# Patient Record
Sex: FEMALE | Race: BLACK OR AFRICAN AMERICAN | Employment: FULL TIME | URBAN - METROPOLITAN AREA
[De-identification: names, ages, dates, MRNs, and addresses within clinical notes are randomized per-mention and may not be internally consistent; named-entity substitution may affect disease eponyms.]

---

## 2017-05-04 PROBLEM — M54.41 CHRONIC BILATERAL LOW BACK PAIN WITH BILATERAL SCIATICA: Status: ACTIVE | Noted: 2017-05-04

## 2017-05-04 PROBLEM — G89.29 CHRONIC BILATERAL LOW BACK PAIN WITH BILATERAL SCIATICA: Status: ACTIVE | Noted: 2017-05-04

## 2017-05-04 PROBLEM — M54.42 CHRONIC BILATERAL LOW BACK PAIN WITH BILATERAL SCIATICA: Status: ACTIVE | Noted: 2017-05-04

## 2017-11-10 PROBLEM — E78.2 MIXED HYPERLIPIDEMIA: Status: ACTIVE | Noted: 2017-11-10

## 2018-04-19 PROBLEM — R73.02 IMPAIRED GLUCOSE TOLERANCE: Status: ACTIVE | Noted: 2018-04-19

## 2018-04-20 ENCOUNTER — OFFICE VISIT (OUTPATIENT)
Dept: SURGERY | Age: 38
End: 2018-04-20

## 2018-04-20 VITALS
BODY MASS INDEX: 40.97 KG/M2 | DIASTOLIC BLOOD PRESSURE: 87 MMHG | OXYGEN SATURATION: 98 % | RESPIRATION RATE: 20 BRPM | SYSTOLIC BLOOD PRESSURE: 142 MMHG | HEIGHT: 64 IN | HEART RATE: 70 BPM | TEMPERATURE: 98.4 F | WEIGHT: 240 LBS

## 2018-04-20 DIAGNOSIS — E66.01 MORBID OBESITY (HCC): Primary | ICD-10-CM

## 2018-04-20 DIAGNOSIS — K21.9 GASTROESOPHAGEAL REFLUX DISEASE WITHOUT ESOPHAGITIS: ICD-10-CM

## 2018-04-20 DIAGNOSIS — R11.2 NAUSEA AND VOMITING, INTRACTABILITY OF VOMITING NOT SPECIFIED, UNSPECIFIED VOMITING TYPE: ICD-10-CM

## 2018-04-20 DIAGNOSIS — I10 ESSENTIAL HYPERTENSION: ICD-10-CM

## 2018-04-20 PROBLEM — E78.2 MIXED HYPERLIPIDEMIA: Status: RESOLVED | Noted: 2017-11-10 | Resolved: 2018-04-20

## 2018-04-20 NOTE — PROGRESS NOTES
Jalen Mims is a 43year old female that present for evaluation for weight loss surgery. Patient has been overweight since childhood. Patient height is 5'4'', weight is 240 pounds, BMI is 41.20    Dietary Habits: Patient eating 3 meals a day. Cooks at home with baked grill fried foods. Denies sweet tooth, eating a lot of starchy foods. Stated decrease in portion sizes. Beverage of choice is water and sweet iced coffee. No exercise. Prior weight loss attempts:    Co-Morbid Conditions/ Past Medical History/ Medications:  Problem List  Date Reviewed: 4/20/2018          Codes Class Noted    Morbid obesity ([facility] [address] ICD-10-CM: E66.01  ICD-9-CM: 278.01  Unknown    Overview Signed 4/20/2018  2:20 PM by Juan R Polo NP     Overweight since childhood. Highest adult weight is 240 pounds. Lowest adult weight was 175 pounds in college             Hyperlipidemia ICD-10-CM: E78.5  ICD-9-CM: 272.4  Unknown        GERD (gastroesophageal reflux disease) ICD-10-CM: K21.9  ICD-9-CM: 530.81  Unknown        Impaired glucose tolerance ICD-10-CM: R73.02  ICD-9-CM: 790.22  4/19/2018        Chronic bilateral low back pain with bilateral sciatica ICD-10-CM: M54.42, M54.41, G89.29  ICD-9-CM: 724.2, 724.3, 338.29  5/4/2017        Insulin resistance ICD-10-CM: E88.81  ICD-9-CM: 277.7  9/12/2014        PMS (premenstrual syndrome) ICD-10-CM: N94.3  ICD-9-CM: 625.4  9/12/2014        Chronic endometritis ICD-10-CM: N71.1  ICD-9-CM: 615.1  9/12/2014        Hypertension ICD-10-CM: I10  ICD-9-CM: 401.9  5/6/2014        Nausea & vomiting ICD-10-CM: R11.2  ICD-9-CM: 787.01  5/3/2011              Current Outpatient Prescriptions:     labetalol (NORMODYNE) 200 mg tablet, TAKE 1 TABLET BY MOUTH TWICE A DAY, Disp: 60 Tab, Rfl: 1    atorvastatin (LIPITOR) 20 mg tablet, Take 1 Tab by mouth daily. , Disp: 90 Tab, Rfl: 1    verapamil ER (VERELAN) 360 mg ER capsule, Take 1 Cap by mouth daily. , Disp: 90 Cap, Rfl: 1    famotidine (PEPCID AC) 10 mg tablet, Take 10 mg by mouth nightly., Disp: , Rfl:       Surgical History/ Prior Hospitalizations/ Psychiatric Admissions:  Past Surgical History:   Procedure Laterality Date    HX DILATION AND CURETTAGE  2015    HX GI      laparotomy, colonoscopy.  HX GYN  2001    cervical cryotherapy    HX GYN  2014    hysteroscopy, hysterosalpingogram    HX HEENT      sinus susrgery    HX PELVIC LAPAROSCOPY  2011    HX PELVIC LAPAROSCOPY  2014    endometriosis vaporization       Pregnancy History and Complications:  3 Para 1 Miscarriage 2    Primary Care and Other Pertinent Providers: Davon Case MD is primary care provider. Allergies:    Allergies   Allergen Reactions    Codeine Itching and Nausea and Vomiting       Family History:   Family History   Problem Relation Age of Onset    Substance Abuse Father     Diabetes Father     Depression Father     Heart Disease Father 61    Cancer Father      prostate    Diabetes Mother     Hypertension Mother    [facility] Depression Mother     Stroke Mother 61    Other Mother      hysterectomy age 29's for dysmenorrhea    Kidney Disease Mother     Diabetes Sister     Diabetes Maternal Grandmother     Hypertension Maternal Grandmother     Heart Disease Maternal Grandmother     Hypertension Maternal Grandfather     Heart Disease Maternal Grandfather     Other Sister      hysterecomy age 29's for dysmenorrhea    Substance Abuse Other             Social History:   Social History     Social History    Marital status:      Spouse name: N/A    Number of children: 1    Years of education: Masters     Occupational History    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[employer]     Social History Main Topics    Smoking status: Never Smoker    Smokeless tobacco: Never Used    Alcohol use No      Comment: on occasion, approx 2x/mo    Drug use: No    Sexual activity: Yes     Partners: Male     Birth control/ protection: None, IUD      Comment:  Other Topics Concern    Not on file     Social History Narrative    : Naty Vines     1981    NKDA    SFA 3/28/2014 - borderline oligospermia (21x10^6), low motility (3%), low morphology (2%)    SFA 3/13/2014 - oligospermia (10x10^6), low motility (30%), low morphology (1%)    Lives with , 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 Physical Examination: Blood Pressure today is 142/87. Patient is a pleasant black female in no acute distress. Patient had adequate dentition. Skin is warm and dry. Chest is clear. Heart with regular rate and rhythmn. Abdomen is obese, soft, non-tender, non-distended, bowels sound present in all four quadrants, no masses palpated. Extremities warm to the touch without edema. Sleeve gastrectomy or vertical gastric resection involves a resection of the vast majority of the stomach usually done with a dilator pressed against the right half of the stomach of the lesser curvature. One preserves the pyloric sphincter at the lower end of the stomach and then sequentially staples and divides all the way up to the gastroesophageal junction leaving a small rim of the stomach to the left better known as the angle of His. This procedure significantly reduces the amount that the stomach can hold while removing the part of the stomach that secretes the hormone grehlin and alters the speed of food emptying from the stomach. Once food leaves the stomach, the remainder of the intestinal tract is completely intact and there is no effect on the digestion or absorption of food nutrients and calories. The procedure is intermediate between the adjustable gastric band and the gastric bypass as far as weight loss, potential complications and resolution of comorbid diseases such as Type 2 diabetes, high blood pressure, sleep apnea, arthritic pain, cholesterol disorders to mention a few.  The usual complications are that of any procedure which affects the ability of the stomach to accept food at any given time. Those are usually nausea and vomiting which either spontaneously resolve or require endoscopic dilatation. The most serious complication from this surgery is a leak from the staple line usually near the  gastroesophageal junction. The frequency of this serious complication is low and usually heals spontaneously although reoperation may be necessary. The other serious complications are those which can occur with any major surgery and include  Infection, bleeding, blood clots and/or cardiopulmonary problems. Patient meets criteria established by the NIH. Without weight reduction, co-morbidities will escalate as well as risk of early mortality. Recommendation is patient could be served with surgical weight reduction, the procedure of laparoscopic sleeve gastrectomy was discussed. I explained to the patient differences between laparoscopic and open gastric bypass, laparoscopic adjustable gastric banding, and sleeve gastrectomy procedures. Patient has attended one our informational meeting and has seen our educational materials. Patient desires to have surgery with Dr. Charlie Manning     I have reinforced without lifestyle change and behavior modification, they would not achieve his weight loss goals. I reviewed risks and complications associated with each procedure. Other recommendations include psychological evaluation, nutritional consultation, GI consultation for possible endoscopy with local GI Specialist. Patient informed she must complete a 6 consecutive month physician supervised diet. I discussed with patient a diet high in protein, low-fat, low- sugar, limited carbohydrates, and discontinuing use of carbonated beverages. Also discussed physical activity and exercises. I have answered all questions, they wish to proceed. CC: Felice Brewer MD        62 minutes was spent with patient.

## 2018-04-20 NOTE — MR AVS SNAPSHOT
[address] [facility] [id number] [phone] Patient: Tony Moore MRN: [id number] LOQ:[birth year] Visit Information Date & Time Provider Department Dept. Phone Encounter #  
 4/20/2018 11:00 AM Teresa Mckenzie NP [place] [phone] [id number] Upcoming Health Maintenance Date Due  
 PAP AKA CERVICAL CYTOLOGY 8/7/2016 Influenza Age 5 to Adult 8/1/2017 DTaP/Tdap/Td series (2 - Td) 8/1/2026 Allergies as of 4/20/2018  Review Complete On: 4/20/2018 By: Teresa Mckenzie NP Severity Noted Reaction Type Reactions Codeine  08/08/2014    Itching, Nausea and Vomiting Current Immunizations  Never Reviewed No immunizations on file. Not reviewed this visit You Were Diagnosed With   
  
 Codes Comments [facility]    -  Primary ICD-10-CM: E66.01 
ICD-9-CM: 278.01 Gastroesophageal reflux disease without esophagitis     ICD-10-CM: K21.9 ICD-9-CM: 530.81 Nausea and vomiting, intractability of vomiting not specified, unspecified vomiting type     ICD-10-CM: R11.2 ICD-9-CM: 787.01 Essential hypertension     ICD-10-CM: I10 
ICD-9-CM: 401.9 BMI 40.0-44.9, adult [facility])     ICD-10-CM: Z68.41 
ICD-9-CM: V85.41 Vitals BP Pulse Temp Resp Height(growth percentile) Weight(growth percentile) 142/87 (BP 1 Location: Left arm, BP Patient Position: Sitting) 70 98.4 °F (36.9 °C) (Oral) 20 5' 4\" (1.626 m) 240 lb (108.9 kg) SpO2 BMI OB Status Smoking Status 98% 41.2 kg/m2 IUD Never Smoker BMI and BSA Data Body Mass Index Body Surface Area  
 41.2 kg/m 2 2.22 m 2 Preferred Pharmacy Pharmacy Name Phone  W MD [address] [phone] Your Updated Medication List  
  
   
This list is accurate as of 4/20/18  3:14 PM.  Always use your most recent med list.  
  
 atorvastatin 20 mg tablet Commonly known as:  LIPITOR Take 1 Tab by mouth daily. labetalol 200 mg tablet Commonly known as:  NORMODYNE  
TAKE 1 TABLET BY MOUTH TWICE A DAY PEPCID AC 10 mg tablet Generic drug:  famotidine Take 10 mg by mouth nightly. verapamil  mg ER capsule Commonly known as:  Kaleta Shafer Take 1 Cap by mouth daily. We Performed the Following REFERRAL TO GASTROENTEROLOGY [LXK52 Custom] REFERRAL TO NUTRITION [REF50 Custom] Referral Information Referral ID Referred By Referred To  
  
 [id number] Kem Apgar Not Available Visits Status Start Date End Date 1 New Request 18 If your referral has a status of pending review or denied, additional information will be sent to support the outcome of this decision. Referral ID Referred By Referred To  
 [id number] Kem Apgar Not Available Visits Status Start Date End Date 1 New Request 18 If your referral has a status of pending review or denied, additional information will be sent to support the outcome of this decision. Introducing [facility] & HEALTH SERVICES! Dear Kimberly Brown: Thank you for requesting a [employer] account. Our records indicate that you already have an active [employer] account. You can access your account anytime at [email or web address] Did you know that you can access your hospital and ER discharge instructions at any time in [employer]? You can also review all of your test results from your hospital stay or ER visit. Additional Information If you have questions, please visit the Frequently Asked Questions section of the [employer] website at [email or web address] Remember, [employer] is NOT to be used for urgent needs. For medical emergencies, dial 911. Now available from your iPhone and Android! Please provide this summary of care documentation to your next provider. Your primary care clinician is listed as Karyn Dutta. If you have any questions after today's visit, please call [phone].

## 2018-10-17 ENCOUNTER — PATIENT OUTREACH (OUTPATIENT)
Dept: OTHER | Age: 38
End: 2018-10-17

## 2018-10-17 NOTE — PROGRESS NOTES
Spoke with Ms. George Rosario. Briefly told her about the program.  She requested that I contact her tomorrow around 12:00, stated she had just been discharged last night from the hospital.  Apologized and told her I would call back, as requested.

## 2018-10-17 NOTE — PATIENT INSTRUCTIONS
Infection After Surgery: Care Instructions Your Care Instructions After surgery, an infection is always possible. It doesn't mean that the surgery didn't go well. Because an infection can be serious, your doctor has taken steps to manage it. Your doctor checked the infection and cleaned it if necessary. He or she may have made an opening in the area so that the pus can drain out. You may have gauze in the cut so that the area will stay open and keep draining. You may need antibiotics. You will need to follow up with your doctor to make sure the infection has gone away. Follow-up care is a key part of your treatment and safety. Be sure to make and go to all appointments, and call your doctor if you are having problems. It's also a good idea to know your test results and keep a list of the medicines you take. How can you care for yourself at home? · Make sure your surgeon knows that you saw a doctor about the infection. · If your doctor prescribed antibiotics, take them as directed. Do not stop taking them just because you feel better. You need to take the full course of antibiotics. · Ask your doctor if you can take an over-the-counter pain medicine, such as acetaminophen (Tylenol), ibuprofen (Advil, Motrin), or naproxen (Aleve). Be safe with medicines. Read and follow all instructions on the label. · Do not take two or more pain medicines at the same time unless the doctor told you to. Many pain medicines have acetaminophen, which is Tylenol. Too much acetaminophen (Tylenol) can be harmful. · Prop up the area on a pillow anytime you sit or lie down during the next 3 days. Try to keep it above the level of your heart. This will help reduce swelling. · Keep the skin clean and dry. · If you have a bandage, keep it clean and dry. · You may have a dressing over the cut (incision). A dressing helps the incision heal and protects it.  Your doctor will tell you how to take care of this. You can expect drainage from the wound. · If your doctor told you how to care for your incision, follow your doctor's instructions. If you did not get instructions, follow this general advice: 
? Wash around the incision with clean water 2 times a day. Don't use hydrogen peroxide or alcohol, which can slow healing. When should you call for help? Call your doctor now or seek immediate medical care if: 
  · You have signs that your infection is getting worse, such as: 
? Increased pain, swelling, warmth, or redness in the area. ? Red streaks leading from the area. ? Pus draining from the wound. ? A new or higher fever.  
 Watch closely for changes in your health, and be sure to contact your doctor if you have any problems. Where can you learn more? Go to http://annie-jose.info/. Enter C340 in the search box to learn more about \"Infection After Surgery: Care Instructions. \" Current as of: November 20, 2017 Content Version: 11.8 © 2388-3321 [employer]wise[employer]. Care instructions adapted under license by [employer] (which disclaims liability or warranty for this information). If you have questions about a medical condition or this instruction, always ask your healthcare professional. [place] [address] any warranty or liability for your use of this information.

## 2018-10-18 ENCOUNTER — PATIENT OUTREACH (OUTPATIENT)
Dept: OTHER | Age: 38
End: 2018-10-18

## 2018-10-18 NOTE — PROGRESS NOTES
Red Flags: 
 
Call your doctor now or seek immediate medical care if: 
  · You have signs that your infection is getting worse, such as: 
? Increased pain, swelling, warmth, or redness in the area. ? Red streaks leading from the area. ? Pus draining from the wound. ? A new or higher fever. HPRR progress note Patient eligible for [employer] Employee care management Discussed the care management program.  Patient agrees to care management services at this time. PMH:  
Past Medical History:  
Diagnosis Date  Chronic pain   
 in back,was dysmenorrhea,  resolved  Endometriosis of ovary 2014  Endometriosis of peritoneum 2014  Family planning w/ limited mucus 2014  GERD (gastroesophageal reflux disease)  Heart murmur  Heavy menstrual bleeding 2014  
 HSV (herpes simplex virus) anogenital infection   
 genital herpes  Hyperlipidemia  Hypertension 2000  Morbid obesity ([facility] [address]  Nausea & vomiting Social History:  
Social History Socioeconomic History  Marital status:  Spouse name: Not on file  Number of children: 1  Years of education: Masters  Highest education level: Not on file Social Needs  Financial resource strain: Not on file  Food insecurity - worry: Not on file  Food insecurity - inability: Not on file  Transportation needs - medical: Not on file  Transportation needs - non-medical: Not on file Occupational History  Occupation: 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Employer: [employer] Tobacco Use  Smoking status: Never Smoker  Smokeless tobacco: Never Used Substance and Sexual Activity  Alcohol use: No  
  Alcohol/week: 0.0 oz  
  Comment: on occasion, approx 2x/mo  Drug use: No  
 Sexual activity: Yes  
  Partners: Male Birth control/protection: None, IUD Comment:  Other Topics Concern  Not on file Social History Narrative : Tayler Levin  1981 NKDA  
 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 Care management assessment completed: 
 
Condition Focused Assessment: 
Surgical/Wound Condition Focused Assessment Skin- any open wounds or incisions? yes Description and location of wound- itchy, two red rashes under breasts (PCP ordered cream) In the last 24 hour have you experienced; Fever no Low body temperature no Chills or shaking no Sweating no Fast heart rate no Fast breathing no Dizziness/lightheadedness no Confusion or unusual change in mental status no Diarrhea no Nausea yesterday (10/17) Vomiting yes dry heaves Shortness of breath or difficulty breathing no Less urine output yes Cold, clammy, and pale skin yes Skin rash or skin color changes yes New or worsening pain? Stays around 5 out of 10 If yes, pain rated 0-10: 5-10 Location/pain characteristics: belly, dull, achy, more pronounced with movement New or worsening numbness or tingling? no If yes, location of numbness and tingling: n/a Activity level- moving several times a day, or as recommended? Yes, went to hair salon and went to [employer]. Abnormal activity level reported: no  
Bathing and showering instructions? yes Nutrition- prescribed diet? Full liquid diet, 64 ounces per day. Today, doing better. Right now at 15 ounces. Tolerating food? yes Hydration- (how much in ounces per day) 15 ounces per day (will call MD regarding this)  Patient advised to get 59 ounces/day Medications- new antibiotic? no 
           Pain medication? yes Does patient understand how and when to take their medications? yes If no, instructed patient on proper medication use? yes Does patient have incentive spirometer? yes If yes, how frequently is patient using ICS? Has not used since she came home, recommended use. Medications: 
New Medications at Discharge: dilaudid, prilosec, phenergan, multivitamins, Calcium Changed Medications at Discharge: no  
Discontinued Medications at Discharge: no 
Current Outpatient Medications Medication Sig  
 atorvastatin (LIPITOR) 20 mg tablet Take 20 mg by mouth daily.  labetalol (NORMODYNE) 200 mg tablet Take 200 mg by mouth daily.  verapamil ER (VERELAN) 180 mg CR capsule Take 180 mg by mouth two (2) times a day.  HYDROmorphone (DILAUDID) 2 mg tablet Take 2 mg by mouth every four (4) hours as needed.  clotrimazole-betamethasone (LOTRISONE) topical cream Apply  to affected area two (2) times a day.  labetalol (NORMODYNE) 200 mg tablet Take 1 Tab by mouth daily.  atorvastatin (LIPITOR) 20 mg tablet TAKE 1 TABLET BY MOUTH EVERY DAY  verapamil ER (VERELAN) 180 mg CR capsule Take 1 Cap by mouth two (2) times a day.  tolterodine ER (DETROL LA) 4 mg ER capsule Take 1 Cap by mouth daily.  famotidine (PEPCID AC) 10 mg tablet Take 1 Tab by mouth nightly. No current facility-administered medications for this visit. There are no discontinued medications. Performed medication reconciliation with patient, and patient verbalizes understanding of administration of home medications. There were no barriers to obtaining medications identified at this time. Preventative Care Health Maintenance Topic Date Due  
 PAP AKA CERVICAL CYTOLOGY  08/07/2016  Influenza Age 5 to Adult  08/01/2018  DTaP/Tdap/Td series (2 - Td) 08/01/2026 Healthy Habits Nutrition: Full Liquid Diet Movement: Activity level appropriate. Walking up steps to go to bathroom. Goals Goals Patient Stated  Understands red flags post discharge. (pt-stated) Pt will maintain liquid diet as stated on discharge instructions over the next 30 days Pt will take medications as prescribed over the next 30 days Pt will monitor for s/s of infection at surgical incision sites (ie, redness, swelling, foul drainage, fever)  and seek medical attention within the next 7 -14 days as needed Pt will keep all scheduled f/u appointments with PCP and Specialist over the next 30 days Barriers/Support system: 
patient and  Barriers/Challenges to Care: []  Decline in memory    []  Language barrier    
[]  Emotional                  []  Limited mobility 
[]  Lack of motivation     [] Vision, hearing or cognitive impairment []  Knowledge [] Financial Barriers []  Lack of support  []  Pain []  Other NONE 
PCP/Specialist follow up:  
Future Appointments Date Time Provider Sepideh Singh 10/23/2018 11:15 AM MD Guille Ac Reviewed red flags with patient, and patient verbalizes understanding. Patient given an opportunity to ask questions. No other clinical/social/functional needs noted. The patient agrees to contact the PCP office for questions related to their healthcare. The patient expressed thanks, offered no additional questions and ended the call. Plan for next call: 10/25/2018

## 2018-10-18 NOTE — PROGRESS NOTES
10/18/2018 4:00 spoke with Tyler Garrett in Dr. Mary Spencer office regarding low fluid intake. Let her know that patient states she has only had 15 ounces of fluid today. Tyler Garrett shared my concern and stated she would have the nurse practitioner reach out to the patient. Provided my call back information.

## 2018-10-19 ENCOUNTER — PATIENT OUTREACH (OUTPATIENT)
Dept: OTHER | Age: 38
End: 2018-10-19

## 2018-10-19 NOTE — PROGRESS NOTES
Covering for Tamra Argueta RN for [facility] follow-up; Care Manager contacted the patient by telephone in follow up. Request for  and zip code as patient identifiers. Patient states she is at drive-thru at [address] getting breakfast for the kids;  
 
State she continues to feel discomfort with consuming liquids. Reminded her she will feel full quickly, especially at first.  States she could only consume 20-25 ounces of liquid yesterday. Denies coughing and dysphagia with any liquids; 
 
States she has not heard back from the surgeon's office as yet;  Encouraged patient to contact the surgeon's office later today to give them an update on the amount of fliud intake today; Also encouraged patient to contact the on call surgeon over the weekend, if her symptoms worsened or her intake remained less than 30ozs; She agreed; 
 
Provided my contact information if she had any questions this afternoon;  Thanked her for taking my call today;

## 2018-10-24 ENCOUNTER — PATIENT OUTREACH (OUTPATIENT)
Dept: OTHER | Age: 38
End: 2018-10-24

## 2018-10-24 NOTE — PROGRESS NOTES
Follow up phone call to patient, two pt identifiers verified. Discussed patient's goals:  
 
Patient has been placed on a purred diet, as of Monday. We discussed the importance of meal prepping. She stated she had good intentions this morning but ran behind and had to order food, which did not agree with her. She recognizes that planning is very important. She states that she has the support of her  and knows that her daughters will benefit from healthier eating habits. She states that it is a big life style change for her family. She was excited to share that she and her family have joined a gym. She plans to participate in [employer] classes. We discussed making this a priority and coming up with a feasible plan for making time to get to the gym. She plans to go in the evenings and will take advantage of free . Patient is aware of her lifting restrictions. She has been using the medication for her fungal infection, under her breasts and also stated she had a sensitivity to the adhesive used on her incisions. Her doctor is aware of this. Roxana King was excited to tell me that one of her blood pressure medications has been cut in half. Provided praise, she is on the right track and is very dedicated to creating a healthy lifestyle. Readiness to Change: []  Pre-contemplative    []  Contemplative 
[]  Preparation               [x]  Action                  []  Maintenance Barriers/Challenges to Care: []  Decline in memory    []  Language barrier    
[]  Emotional                  []  Limited mobility 
[]  Lack of motivation     [] Vision, hearing or cognitive impairment []  Knowledge [] Financial Barriers []  Lack of support  []  Pain []  Other [x]  None Patient has no barriers. Key pt activities to achieve better health:  
[x]  Weight loss 
[]  Improved diabetic control [x]  Decreased cholesterol levels [x]  Decreased blood pressure [x]  Exercise [] Upcoming appointments: December 11, 2018 Plan for next call: In about 2 weeks. Abraham Ferrer that if she needs anything to please call me.

## 2018-11-20 ENCOUNTER — PATIENT OUTREACH (OUTPATIENT)
Dept: OTHER | Age: 38
End: 2018-11-20

## 2018-11-20 NOTE — PROGRESS NOTES
Follow up phone call to patient, two pt identifiers verified. Discussed patient's goals:  
Goals Patient Stated  Hydration per recommendation  (pt-stated) MD recommends 64 ounces of full liquids per day. Discussed importance of maintaining hydration. Patient stated she had only had 15 ounces of fluid today. Asked her if breaking it up by the hours she is awake may help. Provided example of only having to drink 5.3 ounces per hour if she is awake for 12 hours. She thought that may help. Asked her if it would be alright if I called Dr. Reynolds Money office to let them know about the low intake. She said that would be fine.  Understands red flags post discharge. (pt-stated) Pt will maintain liquid diet as stated on discharge instructions over the next 30 days Pt will take medications as prescribed over the next 30 days Pt will monitor for s/s of infection at surgical incision sites (ie, redness, swelling, foul drainage, fever)  and seek medical attention within the next 7 -14 days as needed Pt will keep all scheduled f/u appointments with PCP and Specialist over the next 30 days Other  Understands red flags post discharge. Call your doctor now or seek immediate medical care if: 
  · You have signs that your infection is getting worse, such as: 
? Increased pain, swelling, warmth, or redness in the area. ? Red streaks leading from the area. ? Pus draining from the wound. ? A new or higher fever. Patient states she is having a hard time getting in  grams of protein a day. She is tired of scrambled eggs. She is going to try boiling eggs. She is eating yogurt that's high in protein. I asked about adding protein powder. She says she is using protein shakes. She said she was upset because she found out her scales were broken and she had not lost as much as she had thought.   I reinforced that fact that she is feeling good and that was a huge plus. She is getting to the gym about two times a week. She is having a hard time with meal prepping and would like to work on that. She had to go because her kids needed attention. Told her I'd follow up in a few weeks. She was appreciative. Readiness to Change: []  Pre-contemplative    []  Contemplative 
[]  Preparation               [x]  Action                  []  Maintenance Barriers/Challenges to Care: []  Decline in memory    []  Language barrier    
[]  Emotional                  []  Limited mobility 
[]  Lack of motivation     [] Vision, hearing or cognitive impairment []  Knowledge [] Financial Barriers []  Lack of support  []  Pain []  Other [x]  None Key pt activities to achieve better health:  
[x]  Weight loss 
[]  Improved diabetic control 
[]  Decreased cholesterol levels 
[]  Decreased blood pressure 
[]   
[] Upcoming appointments: Dr. Esther Oliveros 11/26 Future Appointments Date Time Provider Sepideh Singh 11/26/2018  9:00 AM Garcia Madison MD Enoch Pratt Enoch Pratt Plan for next call: December 7th.

## 2018-12-12 ENCOUNTER — PATIENT OUTREACH (OUTPATIENT)
Dept: OTHER | Age: 38
End: 2018-12-12

## 2018-12-12 NOTE — PROGRESS NOTES
Follow up phone call to patient, two pt identifiers verified. Discussed patient's goals:   Goals        Patient Stated     Patient will increase protein (pt-stated)      Patient plans to increase her protein to 80 grams per day. She will maintain this by drinking protein shakes and eating protein bars, as well as meal prepping.  Patient wishes to be 199 lbs by 1/14/19 (pt-stated)      Patient stated she wishes to be 199 or less by January 14, 2019. She plans to increase her protein to 80 grams per day and get back to meal prepping.  Patient wishes to stop blood pressure medication (pt-stated)      Due to her weight loss, patient will monitior BP daily or every other day for about two weeks to ensure pressures are well controlled. She will present that to her PCP with hopes of coming off of her medication.                 Readiness to Change: []  Pre-contemplative    []  Contemplative  []  Preparation               [x]  Action                  []  Maintenance    Barriers/Challenges to Care: []  Decline in memory    []  Language barrier     []  Emotional                  []  Limited mobility  []  Lack of motivation     [] Vision, hearing or cognitive impairment []  Knowledge [] Financial Barriers []  Lack of support  []  Pain []  Other [x]  None    Key pt activities to achieve better health:   [x]  Weight loss  []  Improved diabetic control  []  Decreased cholesterol levels  []  Decreased blood pressure  []    []    Upcoming appointments:   Future Appointments   Date Time Provider Sepideh Singh   2/26/2019  8:45 AM Kervin Madison MD [place] for next call: January 14, 2019

## 2019-01-14 ENCOUNTER — PATIENT OUTREACH (OUTPATIENT)
Dept: OTHER | Age: 39
End: 2019-01-14

## 2019-01-14 NOTE — PROGRESS NOTES
Attempt to reach patient for follow up. Discreet VM left with contact information. Will await call back and/or follow up in a week or so.

## 2019-01-15 ENCOUNTER — PATIENT OUTREACH (OUTPATIENT)
Dept: OTHER | Age: 39
End: 2019-01-15

## 2019-01-15 NOTE — PROGRESS NOTES
Follow up phone call to patient, two pt identifiers verified. Discussed patient's goals:  
 
Goals Addressed This Visit's Progress Patient Stated  Patient will increase protein (pt-stated)   On track Patient plans to increase her protein to 80 grams per day. She will maintain this by drinking protein shakes and eating protein bars, as well as meal prepping. 1/15  Discussed high protein foods to incorporate into meals. Chicken, beans, and fish are currently most utilized. Encouraged patient to use [employer] website to get more ideas.  Patient wishes to stop blood pressure medication (pt-stated)   On track Due to her weight loss, patient will monitior BP daily or every other day for about two weeks to ensure pressures are well controlled. She will present that to her PCP with hopes of coming off of her medication. 1/15 Patient goes back in February to her PCP and will be evaluated to see if she can come off of her blood pressure medication.  Weight loss (pt-stated)   On track Patient stated she wishes to be 199 or less by January 14, 2019. She plans to increase her protein to 80 grams per day and get back to meal prepping. 1/15/2019 Patient states she has reached 201 lbs. She said, \"the holidays were a struggle\"    She is getting bored with meals. She is considering working with a nutritionist that works with people for bariatric keto diets and may sign up for that. She was excited that it also provides meal planning. Offered [email or web address] com for meal planning. She is eating eggs every other day for breakfast. We discussed yogurt with fruit as another option. Suggested okios yogurt, which has 15 grams of protein, but she is trying to stay away from artifical sweeteners. Encouraged patient to look at [facility] website to get ideas for meals. New goal: Patient states, \"I want to be between 195-190 lbs by her birthday, which is [birth year]. \" Readiness to Change: []  Pre-contemplative    []  Contemplative 
[]  Preparation               [x]  Action                  []  Maintenance Barriers/Challenges to Care: []  Decline in memory    []  Language barrier    
[]  Emotional                  []  Limited mobility 
[]  Lack of motivation     [] Vision, hearing or cognitive impairment []  Knowledge [] Financial Barriers []  Lack of support  []  Pain []  Other [x]  None Key pt activities to achieve better health:  
[x]  Weight loss 
[]  Improved diabetic control [x]  Decreased cholesterol levels [x]  Decreased blood pressure 
[]   
[] Upcoming appointments:  
Future Appointments Date Time Provider Sepideh Singh 2/26/2019  8:45 AM Keyur Madison MD Enoch Pratt Enoch Pratt Plan for next call:  Check to see if patient has made it to her goal.  Check on whether she was able to get ideas from emeals and/or nutritionist.  Will call after her birthday ([birth year]).

## 2019-02-21 ENCOUNTER — PATIENT OUTREACH (OUTPATIENT)
Dept: OTHER | Age: 39
End: 2019-02-21

## 2019-02-21 NOTE — PROGRESS NOTES
Follow up phone call to patient, two pt identifiers verified. Discussed patient's goals:  
Goals Addressed This Visit's Progress Patient Stated  Patient will increase protein (pt-stated)   On track Patient plans to increase her protein to 80 grams per day. She will maintain this by drinking protein shakes and eating protein bars, as well as meal prepping. 1/15  Discussed high protein foods to incorporate into meals. Chicken, beans, and fish are currently most utilized. Encouraged patient to use [employer] website to get more ideas.  Patient wishes to stop blood pressure medication (pt-stated)   On track Due to her weight loss, patient will monitior BP daily or every other day for about two weeks to ensure pressures are well controlled. She will present that to her PCP with hopes of coming off of her medication. 1/15 Patient goes back in February to her PCP and will be evaluated to see if she can come off of her blood pressure medication.  Weight loss (pt-stated)   On track Patient stated she wishes to be 199 or less by January 14, 2019. She plans to increase her protein to 80 grams per day and get back to meal prepping. 1/15/2019 Patient states she has reached 201 lbs. She said, \"the holidays were a struggle\"    She is getting bored with meals. She is considering working with a nutritionist that works with people for bariatric keto diets and may sign up for that. She was excited that it also provides meal planning. Offered [email or web address] com for meal planning. She is eating eggs every other day for breakfast. We discussed yogurt with fruit as another option. Suggested okios yogurt, which has 15 grams of protein, but she is trying to stay away from artifical sweeteners. Encouraged patient to look at [facility] website to get ideas for meals. New goal: Patient states, \"I want to be between 195-190 lbs by her birthday, which is [birth year]. \" 2/21/19:  Patient reports she made it to her goal of 195 lbs. Congratulated her for that accomplishment. New Goal: Patient stated, \"I want to get down to 185 lbs by next call. \"   
  
  
 
Patient states her co-workers gave her a meal prepping service for her birthday. They are also holding each other accountable by logging their lunches and talking about what they are going to have for dinner. She does best with accountability. She stated she is not exercising but doing small things like taking stairs, parking farther away in the parking lot and getting up to talk to co workers instead of sending emails. She has a positive attitude and has set a new goal of 185 lbs. Provided encouragement. Patient's primary care provider relationship reviewed with patient and modified, as applicable. Readiness to Change: []  Pre-contemplative    []  Contemplative 
[]  Preparation               [x]  Action                  []  Maintenance Barriers/Challenges to Care: []  Decline in memory    []  Language barrier    
[]  Emotional                  []  Limited mobility 
[]  Lack of motivation     [] Vision, hearing or cognitive impairment []  Knowledge [] Financial Barriers []  Lack of support  []  Pain []  Other [x]  None Key pt activities to achieve better health:  
[x]  Weight loss 
[]  Improved diabetic control 
[]  Decreased cholesterol levels 
[]  Decreased blood pressure 
[]   
[] Upcoming appointments:  
Future Appointments Date Time Provider Sepideh Singh 2/26/2019  8:45 AM Lilly Madison MD Enoch Pratt Enoch Pratt Plan for next call: Met goal weight of 185 lbs? Any needs? Will call back the first week of April.

## 2019-03-29 ENCOUNTER — PATIENT OUTREACH (OUTPATIENT)
Dept: OTHER | Age: 39
End: 2019-03-29

## 2019-04-05 ENCOUNTER — PATIENT OUTREACH (OUTPATIENT)
Dept: OTHER | Age: 39
End: 2019-04-05

## 2019-04-05 NOTE — PROGRESS NOTES
Follow up phone call to patient, two pt identifiers verified. Discussed patient's goals:  
Goals Patient Stated  Patient will increase protein (pt-stated) Patient plans to increase her protein to 80 grams per day. She will maintain this by drinking protein shakes and eating protein bars, as well as meal prepping. 1/15  Discussed high protein foods to incorporate into meals. Chicken, beans, and fish are currently most utilized. Encouraged patient to use [employer] website to get more ideas. 4/5: Patient states she is eating protein first thing in the morning. She got a smoothie maker for her birthday, so she has been  Some may be bad, fried fish from [employer]. Patient states it's not an everyday thing. Encouragement provided.  Patient wishes to stop blood pressure medication (pt-stated) Due to her weight loss, patient will monitior BP daily or every other day for about two weeks to ensure pressures are well controlled. She will present that to her PCP with hopes of coming off of her medication. 1/15 Patient goes back in February to her PCP and will be evaluated to see if she can come off of her blood pressure medication. 4/5: Patient remains on one blood pressure medication.  Weight loss (pt-stated) Patient stated she wishes to be 199 or less by January 14, 2019. She plans to increase her protein to 80 grams per day and get back to meal prepping. 1/15/2019 Patient states she has reached 201 lbs. She said, \"the holidays were a struggle\"    She is getting bored with meals. She is considering working with a nutritionist that works with people for bariatric keto diets and may sign up for that. She was excited that it also provides meal planning. Offered [email or web address] com for meal planning. She is eating eggs every other day for breakfast. We discussed yogurt with fruit as another option.   Suggested okios yogurt, which has 15 grams of protein, but she is trying to stay away from artifical sweeteners. Encouraged patient to look at [facility] website to get ideas for meals. New goal: Patient states, \"I want to be between 195-190 lbs by her birthday, which is [birth year]. \" 
 
2/21/19:  Patient reports she made it to her goal of 195 lbs. Congratulated her for that accomplishment. New Goal: Patient stated, \"I want to get down to 185 lbs by next call. \"   
 
4/5: Made it to 189. Ultimate goal is to get to BMI that's not overweight. BMI for her height of 5'4 is 110-140. Patient's primary care provider relationship reviewed with patient and modified, as applicable. Patient is very appreciative for calls, as she feels I hold her accountable. Will continue to provide encouragement. Readiness to Change: []  Pre-contemplative    []  Contemplative 
[]  Preparation               [x]  Action                  []  Maintenance Barriers/Challenges to Care: []  Decline in memory    []  Language barrier    
[]  Emotional                  []  Limited mobility 
[]  Lack of motivation     [] Vision, hearing or cognitive impairment []  Knowledge [] Financial Barriers []  Lack of support  []  Pain []  Other []  None Key pt activities to achieve better health:  
[x]  Weight loss 
[]  Improved diabetic control [x]  Decreased cholesterol levels [x]  Decreased blood pressure 
[]   
[] Upcoming appointments: Patient has an appointment with her surgeon on 4/18. She plans to see her PCP this month, as well. Plan for next call: Healthy diet and exercise habits? Patient requested that I call back at the end of May. Will call the last week of May.

## 2019-05-29 ENCOUNTER — PATIENT OUTREACH (OUTPATIENT)
Dept: OTHER | Age: 39
End: 2019-05-29

## 2019-05-29 NOTE — PROGRESS NOTES
Attempt to reach patient for follow up. VM was full, unable to accept messages at this time. Will try back next week.

## 2019-06-05 ENCOUNTER — PATIENT OUTREACH (OUTPATIENT)
Dept: OTHER | Age: 39
End: 2019-06-05

## 2019-06-05 NOTE — PROGRESS NOTES
Follow up phone call to patient, two pt identifiers verified. Discussed patient's goals:   Goals        Patient Stated     Patient will increase protein (pt-stated)      Patient plans to increase her protein to 80 grams per day. She will maintain this by drinking protein shakes and eating protein bars, as well as meal prepping. 1/15  Discussed high protein foods to incorporate into meals. Chicken, beans, and fish are currently most utilized. Encouraged patient to use [employer] website to get more ideas. 4/5: Patient states she is eating protein first thing in the morning. She got a smoothie maker for her birthday, so she has been  Some may be bad, fried fish from [employer]. Patient states it's not an everyday thing. Encouragement provided. 6/4: Patient is doing well with her diet. Provided encouragement.  Patient wishes to stop blood pressure medication (pt-stated)      Due to her weight loss, patient will monitior BP daily or every other day for about two weeks to ensure pressures are well controlled. She will present that to her PCP with hopes of coming off of her medication. 1/15 Patient goes back in February to her PCP and will be evaluated to see if she can come off of her blood pressure medication. 4/5: Patient remains on one blood pressure medication.  Weight loss (pt-stated)      Patient stated she wishes to be 199 or less by January 14, 2019. She plans to increase her protein to 80 grams per day and get back to meal prepping. 1/15/2019 Patient states she has reached 201 lbs. She said, \"the holidays were a struggle\"    She is getting bored with meals. She is considering working with a nutritionist that works with people for bariatric keto diets and may sign up for that. She was excited that it also provides meal planning. Offered [employer]. com for meal planning. She is eating eggs every other day for breakfast. We discussed yogurt with fruit as another option. Suggested okios yogurt, which has 15 grams of protein, but she is trying to stay away from artifical sweeteners. Encouraged patient to look at [facility] website to get ideas for meals. New goal: Patient states, \"I want to be between 195-190 lbs by her birthday, which is [birth year]. \"    2/21/19:  Patient reports she made it to her goal of 195 lbs. Congratulated her for that accomplishment. New Goal: Patient stated, \"I want to get down to 185 lbs by next call. \"      4/5: Made it to 189. Ultimate goal is to get to BMI that's not overweight. BMI for her height of 5'4 is 110-140.       6/5: Patient states she has been as low as 179 lbs. She is currently 182 lbs. Patient's primary care provider relationship reviewed with patient and modified, as applicable. Patient states she has been stressed with her job. She is also having problems with heartburn and has started taking prilosec again. Encouraged her to discuss this with her provider. She states she has not had time to exercise. Encouraged her to take time for herself. Readiness to Change: []  Pre-contemplative    []  Contemplative  []  Preparation               [x]  Action                  []  Maintenance    Barriers/Challenges to Care: []  Decline in memory    []  Language barrier     []  Emotional                  []  Limited mobility  []  Lack of motivation     [] Vision, hearing or cognitive impairment []  Knowledge [] Financial Barriers []  Lack of support  []  Pain []  Other [x]  None    Key pt activities to achieve better health:   []  Weight loss  []  Improved diabetic control  []  Decreased cholesterol levels  []  Decreased blood pressure  []    []    Upcoming appointments: No future appointments. Plan for next call: Check in, heartburn? Stress? Discussed this would be last call.

## 2019-07-03 ENCOUNTER — PATIENT OUTREACH (OUTPATIENT)
Dept: OTHER | Age: 39
End: 2019-07-03

## 2019-07-17 ENCOUNTER — PATIENT OUTREACH (OUTPATIENT)
Dept: OTHER | Age: 39
End: 2019-07-17

## 2019-07-17 NOTE — LETTER
7/17/2019 4:01 PM 
 
[address] [facility] [place] [address] Dear Justin Nobles,  
 
I have been trying to reach you for a follow up call for services with our Employee Care Management Program. Your wellbeing is very important to us. With continued partnership in the [facility] program, we hope to work with you to optimize your health and increase your quality of life. I look forward to continuing to work with you. Please contact me at your convenience and we can schedule a time that works best for you. Sincerely, Aimee Kirkpatrick, RN, BSN  Employee Care Manager [address] [place] [address], [address] [place], [place] [address] [facility] Krystian Peters [phone]  F [phone]  [email or web address]

## 2019-07-17 NOTE — PROGRESS NOTES
Attempt to reach patient for follow up. Unable to leave VM because mailbox was full. To send lost to follow up letter.

## 2019-07-18 ENCOUNTER — PATIENT OUTREACH (OUTPATIENT)
Dept: OTHER | Age: 39
End: 2019-07-18

## 2019-07-18 NOTE — PROGRESS NOTES
Patient left me a message stating she was sorry she missed my call, that she was in a meeting. She will try to reach me tomorrow.

## 2019-08-02 ENCOUNTER — DOCUMENTATION ONLY (OUTPATIENT)
Dept: OTHER | Age: 39
End: 2019-08-02

## 2022-03-19 PROBLEM — G89.29 CHRONIC BILATERAL LOW BACK PAIN WITH BILATERAL SCIATICA: Status: ACTIVE | Noted: 2017-05-04

## 2022-03-19 PROBLEM — M54.42 CHRONIC BILATERAL LOW BACK PAIN WITH BILATERAL SCIATICA: Status: ACTIVE | Noted: 2017-05-04

## 2022-03-19 PROBLEM — M54.41 CHRONIC BILATERAL LOW BACK PAIN WITH BILATERAL SCIATICA: Status: ACTIVE | Noted: 2017-05-04

## 2022-03-20 PROBLEM — R73.02 IMPAIRED GLUCOSE TOLERANCE: Status: ACTIVE | Noted: 2018-04-19
